# Patient Record
(demographics unavailable — no encounter records)

---

## 2019-08-15 NOTE — NUR
Patient discharged with v/s stable. Written and verbal after care instructions 
given and explained to mother. 

Patient alert, oriented and mother verbalized understanding of instructions. 
Ambulatory with steady gait. All questions addressed prior to discharge. ID 
band removed. Patient advised to follow up with PMD. Rx of Children's Ibuprofen 
given. Patient educated on indication of medication including possible reaction 
and side effects. Opportunity to ask questions provided and answered.

## 2019-08-15 NOTE — NUR
BIB MOTHER C/O FEVER, POOR APPITIE, AND FREQUENT CRYING X3 DAYS. DENIES N/V/D. 
MOTHER DID NOT GIVE ANY MEDICATIONS. PATIENT HAS 5 POINTS ON FLACC SCALE AT 
THIS TIME; VSS; PATIENT POSITIONED FOR COMFORT; HOB ELEVATED; BEDRAILS UP X1; 
BED DOWN. ER MD MADE AWARE OF PT STATUS. MOTHER IS AT BEDSIDE. WRAPED COLD 
PACKS PROVIDED TO PT.